# Patient Record
(demographics unavailable — no encounter records)

---

## 2024-10-09 NOTE — REASON FOR VISIT
[Home] : at home, [unfilled] , at the time of the visit. [Medical Office: ([facility])___] : at the medical office located in  [Spouse] : spouse [Verbal consent obtained from patient] : the patient, [unfilled] [de-identified] : 7/24/24

## 2024-10-09 NOTE — ASSESSMENT
[FreeTextEntry1] : IMPRESSION: On 7/24/24 he underwent a total sacrectomy for spinal tumor, Laminectomy of S1, S2, S3, S4, S5 for access to epidural spinal tumor. Neurolysis of bilateral nerve roots at least 8 cm of S1, S2, S3, S4, S5 bilaterally. Difficulty modifier. Use of stereotactic navigation for placement of screws. Sacral pelvic fusion with bilateral pelvic screws connected by crosslink and use of allograft bone. Complex plastic surgery closure.  Pt developed a UTI after surgery and had an indwelling woodward catheter that he reports was removed about 2 weeks ago and he is voiding without any issues at this time.  Pt reports numbness in the perineum, left testicle and left side of penis.  He takes Gabapentin 400 mg in am and 600 mg at HS.  He is also under the care of pain management provider Dr. Ortiz and neurosurgeon Dr. Omsan.  Pt takes Tylenol, Tizanidine, and MSO4 ER 15 mg PRN.  He is also taking stool softeners without any adverse effects.   PLAN: F/u with Dr. Rodriguez after MRI pelvis w/wo, CT pelvis and X-rays pelvis. Advised patient to avoid bending, lifting, twisting motions and we will discuss advancing activities after next visit. Continue follow up with Urologist.

## 2024-10-09 NOTE — HISTORY OF PRESENT ILLNESS
[FreeTextEntry1] : RASHAUN GOODWIN is a 50-year-old male being seen for a follow up visit regarding sacral mass. He underwent a sacral mass biopsy that demonstrated a nerve sheath tumor compatible with schwannoma in the biopsy material.  Patholody on 6/17 confirmed schwannoma.  On 7/24/24 he underwent a total sacrectomy for spinal tumor, Laminectomy of S1, S2, S3, S4, S5 for access to epidural spinal tumor. Neurolysis of bilateral nerve roots at least 8 cm of S1, S2, S3, S4, S5 bilaterally. Difficulty modifier. Use of stereotactic navigation for placement of screws. Sacral pelvic fusion with bilateral pelvic screws connected by crosslink and use of allograft bone. Complex plastic surgery closure.  Pt developed a UTI after surgery and had an indwelling woodward catheter that he reports was removed about 2 weeks ago and he is voiding without any issues at this time.  Pt reports numbness in the perineum, left testicle and left side of penis.  He takes Gabapentin 400 mg in am and 600 mg at HS.  He is also under the care of pain management provider Dr. Ortiz and neurosurgeon Dr. Osman.  Pt takes Tylenol, Tizanidine, and MSO4 ER 15 mg PRN.  He is also taking stool softeners without any adverse effects.

## 2024-10-09 NOTE — REVIEW OF SYSTEMS
Elliott Hernandez is a 43 y.o. male is here to be evaluated for a sleep disorder; referred by Timbo Vargas MD.    The patient reports snoring, excessive daytime sleepiness, and excessive daytime fatigue since several years ago.   Elliott Hernandez denied  gasping for air, choking , and witnessed apneas.    The patient does not feel consistently rested upon awakening. Takes occasional naps.     Has been looking for the reason for his fatigue over the last several years.    The patient  denies morning headaches and denies  dry mouth on awakening.   Elliott Hernandez reports  nasal congestion.      The patient occasionally  reports difficulty with falling rather than staying asleep.    Elliott Hernandez  denies symptoms concerning for parasomnia except for occasional somniloquy.  The patient  denies auxiliary symptoms of narcolepsy including sleep onset paralysis, hypnagogic hallucinations, sleep attacks and cataplexy.    Elliott Hernandez denied symptoms concerning for RLS; nocturnal leg movements have not been noticed.   The patient does not experience sleep related leg cramps.       He is a light sleeper    Medications pertinent to sleep  disorders taken currently: no  Previous  medications taken  for sleep disorders:  no    Sleep studies  no        Occupation: owns business. In construction.  Bed partner: his wife  Exercise routine: long walks, bike  Caffeine:  AM beverages per day  Alcohol: weekends wine with dinner - trying to limit that+ now.  Smoking:no  EPWORTH SLEEPINESS SCALE TOTAL SCORE  3/10/2023   Total score 13         EPWORTH SLEEPINESS SCALE 3/10/2023   Sitting and reading 2   Watching TV 2   Sitting, inactive in a public place (e.g. a theatre or a meeting) 2   As a passenger in a car for an hour without a break 2   Lying down to rest in the afternoon when circumstances permit 3   Sitting and talking to someone 0   Sitting quietly after a lunch without  alcohol 2   In a car, while stopped for a few minutes in traffic 0   Total score 13       Sleep Clinic New Patient 3/10/2023   What time do you go to bed on a week day? (Give a range) 830-930   What time do you go to bed on a day off? (Give a range) 830-930   How long does it take you to fall asleep? (Give a range) 30 min to an hour   On average, how many times per night do you wake up? 2-3   How long does it take you to fall back into sleep? (Give a range) A few minites i think   What time do you wake up to start your day on a week day? (Give a range) 530   What time do you wake up to start your day on a day off? (Give a range) 530   What time do you get out of bed? (Give a range) 530   On average, how many hours do you sleep? 7   On average, how many naps do you take per day? 0-1   Rate your sleep quality from 0 to 5 (0-poor, 5-great). 3   Have you experienced:  Weight gain?   How much weight have you lost or gained (in lbs.) in the last year? 10   On average, how many times per night do you go to the bathroom?  1   Have you ever had a sleep study/CPAP machine/surgery for sleep apnea? No   Have you ever had a CPAP machine for sleep apnea? No   Have you ever had surgery for sleep apnea? No       Sleep Clinic ROS  3/10/2023   Difficulty breathing through the nose?  Yes   Sore throat or dry mouth in the morning? No   Irregular or very fast heart beat?  Sometimes   Shortness of breath?  Sometimes   Acid reflux? No   Body aches and pains?  No   Morning headaches? No   Dizziness? No   Mood changes?  No   Do you exercise?  Yes   Do you feel like moving your legs a lot?  No               PAST MEDICAL HISTORY:    Active Ambulatory Problems     Diagnosis Date Noted    Sensorineural hearing loss of left ear 01/13/2017    Excessive daytime sleepiness 02/27/2023    Chronic fatigue 02/27/2023     Resolved Ambulatory Problems     Diagnosis Date Noted    No Resolved Ambulatory Problems     No Additional Past Medical History                 PAST SURGICAL HISTORY:    No past surgical history on file.      FAMILY HISTORY:                Family History   Problem Relation Age of Onset    Cancer Father     Heart disease Maternal Grandfather        SOCIAL HISTORY:          Tobacco:   Social History     Tobacco Use   Smoking Status Former    Types: Cigarettes   Smokeless Tobacco Not on file       alcohol use:    Social History     Substance and Sexual Activity   Alcohol Use Yes    Alcohol/week: 6.0 standard drinks    Types: 3 Glasses of wine, 3 Cans of beer per week                   ALLERGIES:  Review of patient's allergies indicates:  No Known Allergies    CURRENT MEDICATIONS:    Current Outpatient Medications   Medication Sig Dispense Refill    allopurinoL (ZYLOPRIM) 100 MG tablet TAKE 2 TABLETS(200 MG) BY MOUTH EVERY DAY 60 tablet 2    allopurinoL (ZYLOPRIM) 300 MG tablet Take 1 tablet (300 mg total) by mouth once daily. 30 tablet 6     No current facility-administered medications for this visit.                    PHYSICAL EXAM:  BP (!) 143/86 (BP Location: Left arm, Patient Position: Sitting, BP Method: Large (Automatic))   Pulse 83   Wt 114.7 kg (252 lb 12.8 oz)   BMI 31.60 kg/m²   GENERAL: Overweight body habitus, well groomed.  HEENT:   HEENT:  Conjunctivae are non-erythematous; Pupils equal, round, and reactive to light; Nose is symmetrical; Nasal mucosa is pink and moist; Dentition is fair; No TMJ tenderness; Jaw opening and protrusion without click and without discomfort.MP iii-IV  NECK: Supple. Neck circumference is 17.7 inches. No thyromegaly. No palpable nodes.     SKIN: On face and neck: No abrasions, no rashes, no lesions.  No subcutaneous nodules are palpable.  RESPIRATORY: Chest is clear to auscultation.  Normal chest expansion and non-labored breathing at rest.  CARDIOVASCULAR: Normal S1, S2.  No murmurs, gallops or rubs. No carotid bruits bilaterally.  No edema. No clubbing. No cyanosis.    NEURO: Oriented to time, place and  person. Normal attention span and concentration. Gait normal.    PSYCH: Affect is full. Mood is normal  MUSCULOSKELETAL: Moves 4 extremities. Gait normal.           ASSESSMENT:    1. Sleep Apnea NEC. The patient symptomatically has  snoring, gasping for air, choking , excessive daytime sleepiness, and excessive daytime fatigue  with exam findings of crowded airway and elevated BMI. The patient has medical co-morbidities of chronic fatigue  which can be worsened by CHUCKY. This warrants further investigation for possible obstructive sleep apnea.              PLAN:    Diagnostic: Home Sleep Study. The nature of this procedure and its indication was discussed with the patient. We will notify the patient about sleep study resuts via My Chart.          During our discussion today, we talked about the etiology of  CHUCKY as well as the potential ramifications of untreated sleep apnea, which could include daytime sleepiness, hypertension, heart disease and/or stroke.  We discussed potential treatment options, which could include weight loss, body positioning, continuous positive airway pressure (CPAP), or referral for surgical consideration. Meanwhile, he  is urged to avoid supine sleep, weight gain and alcoholic beverages since all of these can worsen CHUCKY.     The patient was given open opportunity to ask questions and/or express concerns about treatment plan. Two point patient identifier confirmed.       Precautions: The patient was advised to abstain from driving should he feel sleepy or drowsy.    Follow up: MD millerw.     46-minute visit. >50% spent counseling patient and coordination of care.  The patient was  cautioned against drowsy driving.            [Negative] : Respiratory

## 2024-10-24 NOTE — ASSESSMENT
[FreeTextEntry1] : IMPRESSION: On 7/24/24 s/p total sacrectomy for spinal tumor. Laminectomy of S1, S2, S3, S4, S5 for access to epidural spinal tumor. Neurolysis of bilateral nerve roots at least 8 cm of S1, S2, S3, S4, S5 bilaterally. Difficulty modifier. Use of stereotactic navigation for placement of screws. Sacral pelvic fusion with bilateral pelvic screws connected by crosslink and use of allograft bone. Complex plastic surgery closure.  Pathology demonstrated Sacral tumor, resection: - Schwannoma    PLAN: 1. No recommended activity restrictions. 2.CT pelvis wo in 3 months 1/2025 3. Telehealth visit to discuss results after imaging 4. Physical therapy rx provided

## 2024-10-24 NOTE — END OF VISIT
[Time Spent: ___ minutes] : I have spent [unfilled] minutes of time on the encounter which excludes teaching and separately reported services. [FreeTextEntry3] : I, Dr.Daniel Rodriguez, personally performed the evaluation and management (E/M) services for this established patient who presents today with (a) new problem(s)/exacerbation of (an) existing condition(s). That E/M includes conducting the clinically appropriate interval history &/or exam, assessing all new/exacerbated conditions, and establishing a new plan of care. Today, my MARCOS, Lulu Castellon, was here to observe my evaluation and management service for this new problem/exacerbated condition and follow the plan of care established by me going forward.

## 2024-10-24 NOTE — REASON FOR VISIT
[Follow-Up: _____] : a [unfilled] follow-up visit [Home] : at home, [unfilled] , at the time of the visit. [Medical Office: ([facility])___] : at the medical office located in  [Patient] : the patient [Self] : self [Spouse] : spouse

## 2024-10-24 NOTE — REASON FOR VISIT
[Follow-Up: _____] : a [unfilled] follow-up visit [Home] : at home, [unfilled] , at the time of the visit. [Medical Office: ([facility])___] : at the medical office located in  [Patient] : the patient [Self] : self [Spouse] : spouse

## 2024-10-24 NOTE — HISTORY OF PRESENT ILLNESS
[FreeTextEntry1] : Nick Diaz is a pleasant gentleman with a very large tumor of the sacrum.  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  He was sent to me.  We did a biopsy and a PET scan.  It was suggestive of schwannoma.  As a result, we decided not to do 2-stage operation with the front and the back as we had planned for a sacrectomy or chordoma as we would want to get negative margins and take out the piece of tumor as 1 piece.  Rather, we decided to do a full sacrectomy for tumor in a piecemeal way saving all the nerve roots.  On 7/24/24 s/p total sacrectomy for spinal tumor. Laminectomy of S1, S2, S3, S4, S5 for access to epidural spinal tumor. Neurolysis of bilateral nerve roots at least 8 cm of S1, S2, S3, S4, S5 bilaterally. Difficulty modifier. Use of stereotactic navigation for placement of screws. Sacral pelvic fusion with bilateral pelvic screws connected by crosslink and use of allograft bone. Complex plastic surgery closure.  Pathology demonstrated Sacral tumor, resection: - Schwannoma   Today he is participating in a visit to discuss his recovery journey and result of MR PELVIS WO AND X-RAY PELVIS DONE DONE ON 10/17/24.  Pt developed a UTI after surgery and had an indwelling woodward catheter. Currently, he states that he is voiding without any issues at this time. Pt reports numbness in the perineum, left testicle and left side of penis. He takes Gabapentin 400 mg in am and 600 mg at HS.  States that he has increased his physical activity but is unable to bend over.  He is also under the care of pain management provider Dr. Ortiz and neurosurgeon Dr. Osman. Pt takes Tylenol, Tizanidine, and MSO4 ER 15 mg PRN. He is also taking stool softeners without any adverse effects.  Advised pt to increase gabapentin to 600 mg in am and 600 mg at HS.

## 2024-10-24 NOTE — DATA REVIEWED
[de-identified] : CT PELVIS 10/24 [de-identified] : MRI PELVIS W/WO CONTRAST 10/17/24 [de-identified] : X-RAY PELVIS 10/24

## 2024-10-24 NOTE — DATA REVIEWED
[de-identified] : CT PELVIS 10/24 [de-identified] : MRI PELVIS W/WO CONTRAST 10/17/24 [de-identified] : X-RAY PELVIS 10/24